# Patient Record
Sex: MALE | Race: WHITE | URBAN - METROPOLITAN AREA
[De-identification: names, ages, dates, MRNs, and addresses within clinical notes are randomized per-mention and may not be internally consistent; named-entity substitution may affect disease eponyms.]

---

## 2021-10-30 ENCOUNTER — OFFICE VISIT (OUTPATIENT)
Dept: FAMILY MEDICINE CLINIC | Age: 61
End: 2021-10-30
Payer: COMMERCIAL

## 2021-10-30 ENCOUNTER — HOSPITAL ENCOUNTER (OUTPATIENT)
Age: 61
Setting detail: SPECIMEN
Discharge: HOME | End: 2021-10-30
Payer: COMMERCIAL

## 2021-10-30 VITALS
DIASTOLIC BLOOD PRESSURE: 80 MMHG | OXYGEN SATURATION: 98 % | SYSTOLIC BLOOD PRESSURE: 130 MMHG | HEIGHT: 65 IN | WEIGHT: 185 LBS | BODY MASS INDEX: 30.82 KG/M2 | HEART RATE: 67 BPM | TEMPERATURE: 97.5 F

## 2021-10-30 DIAGNOSIS — Z20.822 EXPOSURE TO COVID-19 VIRUS: ICD-10-CM

## 2021-10-30 DIAGNOSIS — Z20.822 EXPOSURE TO COVID-19 VIRUS: Primary | ICD-10-CM

## 2021-10-30 LAB
Lab: NORMAL
PERFORMING INSTRUMENT: NORMAL
QC PASS/FAIL: NORMAL
SARS-COV-2, POC: NORMAL

## 2021-10-30 PROCEDURE — 87426 SARSCOV CORONAVIRUS AG IA: CPT | Performed by: NURSE PRACTITIONER

## 2021-10-30 PROCEDURE — 99202 OFFICE O/P NEW SF 15 MIN: CPT | Performed by: NURSE PRACTITIONER

## 2021-10-30 PROCEDURE — U0003 INFECTIOUS AGENT DETECTION BY NUCLEIC ACID (DNA OR RNA); SEVERE ACUTE RESPIRATORY SYNDROME CORONAVIRUS 2 (SARS-COV-2) (CORONAVIRUS DISEASE [COVID-19]), AMPLIFIED PROBE TECHNIQUE, MAKING USE OF HIGH THROUGHPUT TECHNOLOGIES AS DESCRIBED BY CMS-2020-01-R: HCPCS

## 2021-10-30 RX ORDER — FLUOXETINE HYDROCHLORIDE 40 MG/1
CAPSULE ORAL
COMMUNITY
Start: 2021-10-14

## 2021-10-30 RX ORDER — LISINOPRIL 10 MG/1
TABLET ORAL
COMMUNITY
Start: 2021-10-14

## 2021-10-30 RX ORDER — PROPRANOLOL HYDROCHLORIDE 20 MG/1
TABLET ORAL
COMMUNITY
Start: 2021-10-26

## 2021-10-30 SDOH — ECONOMIC STABILITY: FOOD INSECURITY: WITHIN THE PAST 12 MONTHS, THE FOOD YOU BOUGHT JUST DIDN'T LAST AND YOU DIDN'T HAVE MONEY TO GET MORE.: NEVER TRUE

## 2021-10-30 SDOH — ECONOMIC STABILITY: FOOD INSECURITY: WITHIN THE PAST 12 MONTHS, YOU WORRIED THAT YOUR FOOD WOULD RUN OUT BEFORE YOU GOT MONEY TO BUY MORE.: NEVER TRUE

## 2021-10-30 ASSESSMENT — PATIENT HEALTH QUESTIONNAIRE - PHQ9
SUM OF ALL RESPONSES TO PHQ QUESTIONS 1-9: 0
SUM OF ALL RESPONSES TO PHQ9 QUESTIONS 1 & 2: 0
2. FEELING DOWN, DEPRESSED OR HOPELESS: 0
SUM OF ALL RESPONSES TO PHQ QUESTIONS 1-9: 0
1. LITTLE INTEREST OR PLEASURE IN DOING THINGS: 0
SUM OF ALL RESPONSES TO PHQ QUESTIONS 1-9: 0

## 2021-10-30 ASSESSMENT — SOCIAL DETERMINANTS OF HEALTH (SDOH): HOW HARD IS IT FOR YOU TO PAY FOR THE VERY BASICS LIKE FOOD, HOUSING, MEDICAL CARE, AND HEATING?: NOT HARD AT ALL

## 2021-10-30 NOTE — PROGRESS NOTES
[address] Encounter    CHIEF COMPLAINT:     Louie Wang (: 1960) is a 57 y.o. male who presents today for:  Chief Complaint   Patient presents with    Concern For COVID-19     Pt was exposed to covid on tuesday. Pt states person who tested positive for covid and him were in the same proximity but also both masked. Pt is here for a covid test. Pt is not having any symptoms. ASSESSMENT/PLAN    1. Exposure to COVID-19 virus  Comments:  58 y/o M with exposure to COVID-19. asymptomatic. rapid test for SARS-CoV-2 viral antigen was negative. confirmatory PCR test sent. Orders:  -     POCT COVID-19, Antigen  -     Covid-19 Ambulatory; Future    -     Labs per orders.  - Mask indoors + watch for signs/sxs until 14d after last exposure. - If symptoms develop, immediately self-isolate + call for advice/ repeat test.  - Permission given to send letter re: negative results on rapid test to [email or web address] Return for follow-up as needed should any signs or symptoms of illness develop. SUBJECTIVE/OBJECTIVE:    History of Present Illness:  Franklin Danielle is currently: Exposed to COVID-19. Presenting symptoms: asymptomatic. Patient denies: fever, chills, cough, sore throat, shortness of breath, nasal congestion, sputum production, chest pain, abdominal pain, N/V/D, headache, muscle pain, loss of smell, loss of taste, malaise. Exposure source: 10/26- social contact, indoors, both were wearing face masks. Relevant PMH: no pertinent PMH. Non-smoker. [place]. Fully vaccinated for COVID-19.       Previous Medications    ATORVASTATIN CALCIUM PO    Take 20 mg by mouth    FLUOXETINE (PROZAC) 40 MG CAPSULE        LISINOPRIL (PRINIVIL;ZESTRIL) 10 MG TABLET    TAKE 1 TABLET BY MOUTH EVERY DAY    PROPRANOLOL (INDERAL) 20 MG TABLET    TAKE 1 TO 2 TABLETS PRIOR TO PERFORMANCES AS NEEDED     No Known Allergies     Review of Systems Constitutional: Negative for chills and fever. HENT: Negative for congestion, rhinorrhea and sore throat. Respiratory: Negative for cough and shortness of breath. Cardiovascular: Negative for chest pain. Gastrointestinal: Negative for diarrhea, nausea and vomiting. Musculoskeletal: Negative for myalgias. Neurological: Negative for headaches. Vitals:  Vitals:    10/30/21 1157   BP: 130/80   Site: Right Upper Arm   Position: Sitting   Cuff Size: Large Adult   Pulse: 67   Temp: 97.5 °F (36.4 °C)   TempSrc: Temporal   SpO2: 98%   Weight: 185 lb (83.9 kg)   Height: 5' 5\" (1.651 m)     Physical Exam  Vitals and nursing note reviewed. Constitutional:       General: He is not in acute distress. Appearance: He is well-groomed. He is not toxic-appearing. HENT:      Head: Normocephalic and atraumatic. Right Ear: External ear normal.      Left Ear: External ear normal.      Nose: Nose normal.      Mouth/Throat:      Lips: Pink. Mouth: Mucous membranes are moist. No oral lesions. Pharynx: Oropharynx is clear. Uvula midline. Eyes:      General: Lids are normal.      Conjunctiva/sclera: Conjunctivae normal.      Pupils: Pupils are equal, round, and reactive to light. Neck:      Trachea: Trachea and phonation normal.   Cardiovascular:      Rate and Rhythm: Normal rate and regular rhythm. Heart sounds: S1 normal and S2 normal. No murmur heard. No friction rub. No gallop. Pulmonary:      Effort: Pulmonary effort is normal. No tachypnea. Breath sounds: Normal breath sounds and air entry. Musculoskeletal:         General: Normal range of motion. Cervical back: Normal range of motion and neck supple. Right lower leg: No edema. Left lower leg: No edema. Lymphadenopathy:      Cervical: No cervical adenopathy. Skin:     General: Skin is warm and dry. Comments: normal turgor   Neurological:      General: No focal deficit present.       Mental Status: He is alert. Mental status is at baseline. Gait: Gait is intact. Psychiatric:         Mood and Affect: Mood and affect normal.         Behavior: Behavior is cooperative. POC Testing Today:   SARS-COV-2, POC   Date Value Ref Range Status   10/30/2021 Not-Detected Not Detected Final           Side effects and adverse effects of any medication prescribed today, as well as treatment plan/rationale, follow-up care, and result expectations have been discussed with the patient. Jordan Kimbrough expresses understanding and wishes to proceed with the plan. Discussed signs and symptoms which require immediate follow-up in ED/call to 911. Understanding verbalized. I have reviewed and updated the electronic medical record.     Electronically signed by CIRO Burleson CNP on 10/30/2021 at 12:39 PM

## 2021-10-30 NOTE — PATIENT INSTRUCTIONS
1. Rapid test for SARS-CoV-2 viral antigen was negative. 2. Confirmatory PCR test sent- we'll call with results. 3. Continue to wear a mask indoors + watch for symptoms until 14 days after exposure (11/06). 4. Should any signs/symptoms of illness develop- immediately self-isolate and call this office or your PCP office for advice/ repeat COVID-19 test.    Recent Results (from the past 24 hour(s))   POCT COVID-19, Antigen    Collection Time: 10/30/21 12:25 PM   Result Value Ref Range    SARS-COV-2, POC Not-Detected Not Detected    Lot Number [id number]     QC Pass/Fail PASS     Performing Instrument BD Veritor      Patient Education     Learning About COVID-19 and Social Distancing  What is it? Social distancing means putting space between yourself and other people. The recommended distance is 6 feet, or about 2 meters. This also means staying away from any place where people may gather, such as bell or other public gathering places. Why is it important? Social distancing is the best way to reduce the spread of COVID-19. This virus seems to spread from person to person through droplets from coughing and sneezing. So if you keep your distance from others, you're less likely to get it or spread it. And social distancing is important for everyone, not just those who are at high risk of infection, like older people. You might have the virus but not have symptoms. You could then give the infection to someone you come into contact with. How is it done? Experts recommend putting at least 6 feet (2 meters) between you and other people. And wear masks if you are around people you don't live with. So follow this advice, if possible. · Work from home. · Avoid having visitors. If you have to have visitors, they need to wear a mask and stay at least 6 feet (2 meters) away from you. And keep the visit as short as possible. · Increase airflow in your home if people visit. Here's how:  ?  If you can, open some windows or doors to the outside. ? Use a fan to blow air away from people and out a window. ? Turn on exhaust fans in your kitchen and bathroom. Doing these things can help reduce the amount of virus particles (droplets) that travel through the air. · Don't travel if you don't have to. And avoid public transportation, ride-shares, and taxis unless you have no choice. · Limit shopping to essentials, like food and medicines. · Don't eat in restaurants. You can still get takeout or food deliveries. · Avoid crowds and busy places. Be sure to follow all instructions from your local health authorities. Where can you learn more? Go to https://[employer].compepiceweb.[employer] Web. org and sign in to your [employer] account. Enter A133 in the [employer] box to learn more about \"Learning About COVID-19 and Social Distancing. \"     If you do not have an account, please click on the \"Sign Up Now\" link. Current as of: March 26, 2021               Content Version: 12.9  © 5388-3182 [employer]. Care instructions adapted under license by [facility] ([facility]). If you have questions about a medical condition or this instruction, always ask your healthcare professional. [address] any warranty or liability for your use of this information.

## 2021-11-02 LAB
SARS-COV-2: NOT DETECTED
SOURCE: NORMAL

## 2021-11-28 ASSESSMENT — ENCOUNTER SYMPTOMS
DIARRHEA: 0
SHORTNESS OF BREATH: 0
COUGH: 0
NAUSEA: 0
VOMITING: 0
RHINORRHEA: 0
SORE THROAT: 0